# Patient Record
Sex: MALE | Race: AMERICAN INDIAN OR ALASKA NATIVE
[De-identification: names, ages, dates, MRNs, and addresses within clinical notes are randomized per-mention and may not be internally consistent; named-entity substitution may affect disease eponyms.]

---

## 2017-01-09 ENCOUNTER — HOSPITAL ENCOUNTER (OUTPATIENT)
Dept: HOSPITAL 5 - OR | Age: 67
Discharge: HOME | End: 2017-01-09
Attending: UROLOGY
Payer: MEDICARE

## 2017-01-09 VITALS — DIASTOLIC BLOOD PRESSURE: 88 MMHG | SYSTOLIC BLOOD PRESSURE: 165 MMHG

## 2017-01-09 DIAGNOSIS — Z82.49: ICD-10-CM

## 2017-01-09 DIAGNOSIS — Z90.79: ICD-10-CM

## 2017-01-09 DIAGNOSIS — Z85.46: ICD-10-CM

## 2017-01-09 DIAGNOSIS — N13.1: Primary | ICD-10-CM

## 2017-01-09 DIAGNOSIS — Z80.8: ICD-10-CM

## 2017-01-09 DIAGNOSIS — I10: ICD-10-CM

## 2017-01-09 DIAGNOSIS — Z87.891: ICD-10-CM

## 2017-01-09 LAB
ANION GAP SERPL CALC-SCNC: 16 MMOL/L
BUN SERPL-MCNC: 20 MG/DL (ref 9–20)
BUN/CREAT SERPL: 13.33 %
CALCIUM SERPL-MCNC: 9.3 MG/DL (ref 8.4–10.2)
CHLORIDE SERPL-SCNC: 102.5 MMOL/L (ref 98–107)
CO2 SERPL-SCNC: 25 MMOL/L (ref 22–30)
GLUCOSE SERPL-MCNC: 104 MG/DL (ref 75–100)
HCT VFR BLD CALC: 35.5 % (ref 35.5–45.6)
HGB BLD-MCNC: 11.1 GM/DL (ref 11.8–15.2)
POTASSIUM SERPL-SCNC: 4.3 MMOL/L (ref 3.6–5)
SODIUM SERPL-SCNC: 139 MMOL/L (ref 137–145)

## 2017-01-09 PROCEDURE — C2617 STENT, NON-COR, TEM W/O DEL: HCPCS

## 2017-01-09 PROCEDURE — 80048 BASIC METABOLIC PNL TOTAL CA: CPT

## 2017-01-09 PROCEDURE — 74000: CPT

## 2017-01-09 PROCEDURE — 36415 COLL VENOUS BLD VENIPUNCTURE: CPT

## 2017-01-09 PROCEDURE — 85018 HEMOGLOBIN: CPT

## 2017-01-09 PROCEDURE — 52332 CYSTOSCOPY AND TREATMENT: CPT

## 2017-01-09 PROCEDURE — 85014 HEMATOCRIT: CPT

## 2017-01-09 PROCEDURE — A4217 STERILE WATER/SALINE, 500 ML: HCPCS

## 2017-01-09 RX ADMIN — SODIUM CHLORIDE SCH MLS/HR: 0.9 INJECTION, SOLUTION INTRAVENOUS at 09:15

## 2017-01-09 RX ADMIN — HYDROMORPHONE HYDROCHLORIDE PRN MG: 1 INJECTION, SOLUTION INTRAMUSCULAR; INTRAVENOUS; SUBCUTANEOUS at 09:31

## 2017-01-09 RX ADMIN — MIDAZOLAM NR MG: 1 INJECTION INTRAMUSCULAR; INTRAVENOUS at 07:15

## 2017-01-09 RX ADMIN — SODIUM CHLORIDE SCH MLS/HR: 0.9 INJECTION, SOLUTION INTRAVENOUS at 06:55

## 2017-01-09 RX ADMIN — HYDROMORPHONE HYDROCHLORIDE PRN MG: 1 INJECTION, SOLUTION INTRAMUSCULAR; INTRAVENOUS; SUBCUTANEOUS at 09:10

## 2017-01-09 RX ADMIN — MIDAZOLAM NR MG: 1 INJECTION INTRAMUSCULAR; INTRAVENOUS at 08:00

## 2017-01-09 NOTE — POST ANESTHESIA EVALUATION
- Post Anesthesia Evaluation


Patient Participated: Yes


Airway Patent: Yes


Stable Respiratory Function: Yes


Nausea/Vomiting: No


Temp > 96.8F: Yes


Pain Manageable: Yes


Adequeate Hydration: Yes


Anesthesia Complications: No


Block Receding Appropriately: Not Applicable


Patient on Ventilator: No

## 2017-01-09 NOTE — DISCHARGE SUMMARY
Short Stay Discharge Plan


Activity: other (no straining )


Weight Bearing Status: Full Weight Bearing


Diet: regular


Special Instructions: other (inc fluids )


Durable Medical Equipment Needed Upon Discharge: other (j stent )


Follow up with: 


AMRY JOLLY II, MD [Primary Care Provider] - 7 Days


EMMA DUPONT MD [Staff Physician] - 6 Weeks

## 2017-01-09 NOTE — XRAY REPORT
SUPINE KUB:



The abdominal gas pattern is unremarkable.  No masses or

organomegaly is identified and there is no gross evidence of free air 

or fluid.  No significant soft tissue calcifications are noted. Left 

ureteral stent exchange was performed by Dr. Germain.



IMPRESSION:

No acute abdominal process.

## 2017-01-09 NOTE — OPERATIVE REPORT
INDICATIONS:  The patient is a gentleman with a history of extensive prostate

cancer, post radical prostatectomy.  He developed a post-radiation and likely

from extensive disease, a left ureteral stricture.  This has been treated with

stenting.  He has done quite well.  We had a 7-Spanish 24 cm double J, he did

well, now presents for stent exchange.



DESCRIPTION OF PROCEDURE:  The patient was brought to the operating room and

placed on the operating table.  Following induction of anesthesia, placed in

lithotomy position, prepped and draped in usual sterile fashion. 

Cystourethroscopy showed the stent.  There was minimal erythema around the

stent.  The urethra was quite narrow through the penile urethra, but no distinct

stricture, just quite narrow.  The bladder neck was wide open.  At this point,

the stent was grasped and wire placed up in the kidney and stent exchanged to a

7 x 24 once again.  The patient tolerated the procedure well and brought to

recovery room, family notified, without a Bender in stable condition.





DD: 01/09/2017 08:58

DT: 01/09/2017 09:25

JOB# [id number]  [id number]

MIGUEL/WEST

## 2017-01-09 NOTE — ANESTHESIA CONSULTATION
Anesthesia Consult and Med Hx


Date of service: 01/09/17





- Airway


Anesthetic Teeth Evaluation: Dentures


ROM Head & Neck: Adequate


Mental/Hyoid Distance: Adequate


Mallampati Class: Class II


Intubation Access Assessment: Probably Good





- Pulmonary Exam


CTA: Yes





- Cardiac Exam


Cardiac Exam: RRR





- Pre-Operative Health Status


ASA Pre-Surgery Classification: ASA2


Proposed Anesthetic Plan: General





- Pulmonary


Hx Smoking: Yes (STOPPED X 30 YRS-1/4-1/2 PPD)


Hx Asthma: No


SOB: No (positive TB test - took no meds)


Hx Sleep Apnea: No (NAUN PRE SCREEN HIGH RISK)





- Cardiovascular System


Hx Hypertension: Yes (X 4 YRS)


Hx Coronary Artery Disease: No


Hx Heart Attack/AMI: No


Hx Angina: No


Hx Cardia Arrhythmia: No


Hx Heart Murmur: No





- Central Nervous System


Hx Neuromuscular Disorder:  (s/p shingles; hx right leg fx)


Hx Psychiatric Problems: No





- Gastrointestinal


Hx Gastroesophageal Reflux Disease: No





- Endocrine


Hx End Stage Renal Disease: No


Hx Liver Disease: No


Hx Non-Insulin Dependent Diabetes: No


Hx Thyroid Disease: No





- Other Systems


Hx Alcohol Use: No


Hx Substance Use: No


Hx Cancer: Yes (Prostate CA/RADIATION 2015)

## 2017-01-09 NOTE — ANESTHESIA DAY OF SURGERY
Anesthesia Day of Surgery





- Day of Surgery


Patient Examined: Yes


Patient H&P Reviewed: Yes


Patient is NPO: Yes

## 2017-01-09 NOTE — POST OPERATIVE NOTE
Pre-op diagnosis: L ureteral stricture 


Post-op diagnosis: same


Findings: 





as above 


Procedure: 





cysto l stent exchange 


Anesthesia: GETA


Surgeon: EMMA DUPONT


Estimated blood loss: none


Pathology: none


Condition: stable


Disposition: PACU

## 2017-04-26 ENCOUNTER — HOSPITAL ENCOUNTER (OUTPATIENT)
Dept: HOSPITAL 5 - OR | Age: 67
Discharge: HOME | End: 2017-04-26
Attending: UROLOGY
Payer: MEDICARE

## 2017-04-26 VITALS — SYSTOLIC BLOOD PRESSURE: 162 MMHG | DIASTOLIC BLOOD PRESSURE: 75 MMHG
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
## 2017-04-26 DIAGNOSIS — Z87.891: ICD-10-CM

## 2017-04-26 DIAGNOSIS — N13.1: ICD-10-CM

## 2017-04-26 DIAGNOSIS — I10: ICD-10-CM

## 2017-04-26 DIAGNOSIS — C61: ICD-10-CM

## 2017-04-26 DIAGNOSIS — T83.592A: Primary | ICD-10-CM

## 2017-04-26 LAB
ANION GAP SERPL CALC-SCNC: 16 MMOL/L
BUN SERPL-MCNC: 24 MG/DL (ref 9–20)
BUN/CREAT SERPL: 14.11 %
CALCIUM SERPL-MCNC: 9.3 MG/DL (ref 8.4–10.2)
CHLORIDE SERPL-SCNC: 102.6 MMOL/L (ref 98–107)
CO2 SERPL-SCNC: 25 MMOL/L (ref 22–30)
GLUCOSE SERPL-MCNC: 90 MG/DL (ref 75–100)
POTASSIUM SERPL-SCNC: 4.3 MMOL/L (ref 3.6–5)
SODIUM SERPL-SCNC: 139 MMOL/L (ref 137–145)

## 2017-04-26 PROCEDURE — 80048 BASIC METABOLIC PNL TOTAL CA: CPT

## 2017-04-26 PROCEDURE — C1758 CATHETER, URETERAL: HCPCS

## 2017-04-26 PROCEDURE — C2617 STENT, NON-COR, TEM W/O DEL: HCPCS

## 2017-04-26 PROCEDURE — 36415 COLL VENOUS BLD VENIPUNCTURE: CPT

## 2017-04-26 PROCEDURE — 74000: CPT

## 2017-04-26 PROCEDURE — C1769 GUIDE WIRE: HCPCS

## 2017-04-26 PROCEDURE — 52332 CYSTOSCOPY AND TREATMENT: CPT

## 2017-04-26 PROCEDURE — A4217 STERILE WATER/SALINE, 500 ML: HCPCS

## 2017-04-26 NOTE — ANESTHESIA CONSULTATION
Anesthesia Consult and Med Hx


Date of service: 04/26/17





- Airway


Anesthetic Teeth Evaluation: Dentures, Edentulous


ROM Head & Neck: Adequate


Mental/Hyoid Distance: Adequate


Mallampati Class: Class II


Intubation Access Assessment: Probably Good





- Pulmonary Exam


CTA: Yes





- Cardiac Exam


Cardiac Exam: RRR





- Pre-Operative Health Status


ASA Pre-Surgery Classification: ASA3


Proposed Anesthetic Plan: General





- Pulmonary


Hx Smoking: Yes (STOPPED X 15 YRS-1/4-1/2 PPD)


Hx Asthma: No


SOB: No


Hx Sleep Apnea: No (NAUN PRE SCREEN HIGH RISK.)





- Cardiovascular System


Hx Hypertension: Yes (X 4 YRS)


Hx Cardia Arrhythmia: No


Hx Heart Murmur: No





- Central Nervous System


Hx Seizures: No


CVA: No


Hx Psychiatric Problems: No





- Gastrointestinal


Hx Gastroesophageal Reflux Disease: No





- Endocrine


Hx Renal Disease: No (hydronephrosis of right kidney 2/2 scarring)


Hx End Stage Renal Disease: No


Hx Liver Disease: No


Hx Non-Insulin Dependent Diabetes: No


Hx Thyroid Disease: No





- Other Systems


Hx Alcohol Use: No


Hx Substance Use: No


Hx Cancer: Yes (prostate 2015 s/p surgery and radiation)


Hx Obesity: No





- Additional Comments


Anesthesia Medical History Comments: h/o shingles on right chest, no prior 

anesthesia problems.

## 2017-04-26 NOTE — POST OPERATIVE NOTE
Date of procedure: 04/26/17


Pre-op diagnosis: L hydro 


Post-op diagnosis: same


Findings: 





as above


Procedure: 





cysto stent change 


Anesthesia: ANUM


Surgeon: EMMA DUPONT


Estimated blood loss: none


Pathology: none


Condition: stable


Disposition: PACU

## 2017-04-26 NOTE — XRAY REPORT
Retrograde pyelogram:



Hydronephrosis.



Initial image demonstrates a left nephroureteral stent. This apparently 

was exchanged for a new stent which appears in good positioning within 

the kidney and bladder. No other information.

## 2017-04-26 NOTE — POST ANESTHESIA EVALUATION
- Post Anesthesia Evaluation


Patient Participated: Yes


Airway Patent: Yes


Stable Respiratory Function: Yes


Nausea/Vomiting: No


Temp > 96.8F: Yes


Pain Manageable: Yes


Adequeate Hydration: Yes


Anesthesia Complications: No

## 2017-04-26 NOTE — OPERATIVE REPORT
PREOPERATIVE DIAGNOSES:  Prostate cancer, distal left ureteral stricture.



POSTOPERATIVE DIAGNOSES:  Prostate cancer, distal left ureteral stricture.



PROCEDURE:  Cystoscopy, stent exchange.



SURGEON:  José Miguel Germain MD



ANESTHESIA:  General.



FINDINGS:  This is a gentleman who has a distal ureteral stricture.  Each time,

we tried to take the stent out, he develops an infection, get sick, so he now

presents for stent exchange.



DESCRIPTION OF PROCEDURE:  The patient was brought to the operating room and

placed on the operating table.  Following induction of anesthesia, placed in

lithotomy position, prepped and draped in usual sterile fashion. 

Cystourethroscopy easily showed the stent, which was withdrawn from the meatus. 

A wire coiled in the kidney and a 7-Russian J-stent coiled in the kidney and

bladder.  The patient tolerated the procedure well.  No significant

complication.  No catheter was left.  He was brought to recovery room in stable

condition.





DD: 04/26/2017 13:52

DT: 04/26/2017 14:11

JOB# [id number]  [id number]

MIGUEL/WEST

## 2017-04-26 NOTE — DISCHARGE SUMMARY
Short Stay Discharge Plan


Activity: other (no straining )


Weight Bearing Status: Full Weight Bearing


Diet: regular


Special Instructions: other (inc fluids )


Durable Medical Equipment Needed Upon Discharge: other (j stent )


Follow up with: 


MARY JOLLY II, MD [Primary Care Provider] - 7 Days


EMMA DUPONT MD [Staff Physician] - 6 Weeks

## 2017-09-15 ENCOUNTER — HOSPITAL ENCOUNTER (OUTPATIENT)
Dept: HOSPITAL 5 - OR | Age: 67
Discharge: HOME | End: 2017-09-15
Attending: UROLOGY
Payer: COMMERCIAL

## 2017-09-15 VITALS — DIASTOLIC BLOOD PRESSURE: 76 MMHG | SYSTOLIC BLOOD PRESSURE: 150 MMHG

## 2017-09-15 DIAGNOSIS — Z87.891: ICD-10-CM

## 2017-09-15 DIAGNOSIS — N13.30: Primary | ICD-10-CM

## 2017-09-15 DIAGNOSIS — Z79.899: ICD-10-CM

## 2017-09-15 DIAGNOSIS — Z85.46: ICD-10-CM

## 2017-09-15 DIAGNOSIS — Z79.82: ICD-10-CM

## 2017-09-15 DIAGNOSIS — I10: ICD-10-CM

## 2017-09-15 PROCEDURE — 52332 CYSTOSCOPY AND TREATMENT: CPT

## 2017-09-15 PROCEDURE — A4217 STERILE WATER/SALINE, 500 ML: HCPCS

## 2017-09-15 PROCEDURE — C1769 GUIDE WIRE: HCPCS

## 2017-09-15 PROCEDURE — 74420 UROGRAPHY RTRGR +-KUB: CPT

## 2017-09-15 PROCEDURE — C1758 CATHETER, URETERAL: HCPCS

## 2017-09-15 PROCEDURE — C2617 STENT, NON-COR, TEM W/O DEL: HCPCS

## 2017-09-15 RX ADMIN — HYDROMORPHONE HYDROCHLORIDE PRN MG: 1 INJECTION, SOLUTION INTRAMUSCULAR; INTRAVENOUS; SUBCUTANEOUS at 16:40

## 2017-09-15 RX ADMIN — HYDROMORPHONE HYDROCHLORIDE PRN MG: 1 INJECTION, SOLUTION INTRAMUSCULAR; INTRAVENOUS; SUBCUTANEOUS at 16:55

## 2017-09-15 RX ADMIN — HYDROMORPHONE HYDROCHLORIDE PRN MG: 1 INJECTION, SOLUTION INTRAMUSCULAR; INTRAVENOUS; SUBCUTANEOUS at 18:10

## 2017-09-15 NOTE — SHORT STAY SUMMARY
Short Stay Documentation


Date of service: 09/15/17





- History


H&P: obtained from office





- Allergies and Medications


Current Medications: 


 Allergies





No Known Allergies Allergy (Verified 12/30/16 14:18)


 





 Home Medications











 Medication  Instructions  Recorded  Confirmed  Last Taken  Type


 


Aspirin EC [Aspirin Enteric Coated 81 mg PO DAILY 07/02/15 09/11/17 09/08/17 

History





TAB]     


 


amLODIPine [Norvasc] 10 mg PO DAILY 07/02/15 09/15/17 09/14/17 History








Active Medications





Famotidine (Pepcid)  20 mg PO PREOP NR


   Stop: 09/15/17 23:59


   Last Admin: 09/15/17 12:59 Dose:  20 mg


Hydromorphone HCl (Dilaudid)  0.5 mg IV Q10MIN PRN


   PRN Reason: Pain , Severe (7-10)


   Stop: 09/18/17 14:16


Sodium Chloride (Nacl 0.9% 1000 Ml)  1,000 mls @ 75 mls/hr IV AS DIRECT DEJAH


   Last Admin: 09/15/17 13:00 Dose:  75 mls/hr











- Brief post op/procedure progress note


Date of procedure: 09/15/17


Post-op diagnosis: same


Procedure: 





cysto, brpg, left stnet xchange 7x24


Anesthesia: GETA


Findings: 





nodev


Surgeon: CARMEN VAIL


Estimated blood loss: minimal


Pathology: none


Condition: stable





- Hospital course


Hospital course: 





orpacuhome





- Disposition


Condition at discharge: Good


Disposition: DC-01 TO HOME OR SELFCARE





Short Stay Discharge Plan


Activity: advance as tolerated


Diet: advance as tolerated


Follow up with: 


EMMA DUPONT MD [Staff Physician] - 7 Days

## 2017-09-15 NOTE — FLUOROSCOPY REPORT
Retrograde pyelogram:



The initial image demonstrates the presence of a left nephroureteral 

stent. 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 

annelise dissection. Injection of contrast into the right ureter is 

unremarkable with good opacification of the ureter and renal collecting 

system. At some point contrast apparently was injected on the left 

since is a small amount of contrast in slightly dilated calyces. A left 

internal stent was left in place.

## 2017-09-15 NOTE — ANESTHESIA CONSULTATION
Anesthesia Consult and Med Hx


Date of service: 09/15/17





- Airway


Anesthetic Teeth Evaluation: Edentulous


ROM Head & Neck: Adequate


Mental/Hyoid Distance: Adequate


Mallampati Class: Class II


Intubation Access Assessment: Probably Good





- Pulmonary Exam


CTA: Yes





- Cardiac Exam


Cardiac Exam: RRR





- Pre-Operative Health Status


ASA Pre-Surgery Classification: ASA2


Proposed Anesthetic Plan: General





- Pulmonary


Hx Smoking: Yes (STOPPED X 15 YRS-1/4-1/2 PPD)


Hx Asthma: No


SOB: No


Hx Sleep Apnea: No (NAUN PRE SCREEN HIGH RISK.)





- Cardiovascular System


Hx Hypertension: Yes (X 4 YRS)


Hx Cardia Arrhythmia: No


Hx Heart Murmur: No





- Central Nervous System


Hx Neuromuscular Disorder:  (s/p shingles; hx right leg fx)


Hx Seizures: No


CVA: No


Hx Psychiatric Problems: No





- Gastrointestinal


Hx Gastroesophageal Reflux Disease: No





- Endocrine


Hx Renal Disease: No


Hx End Stage Renal Disease: No


Hx Liver Disease: No


Hx Non-Insulin Dependent Diabetes: No


Hx Thyroid Disease: No





- Other Systems


Hx Alcohol Use: No


Hx Substance Use: No


Hx Cancer: Yes (prostate 2015 s/p surgery and radiation)


Hx Obesity: No

## 2017-09-27 NOTE — OPERATIVE REPORT
UROLOGY OPERATION NOTE



PREOPERATIVE DIAGNOSES:  Prostate cancer and hydronephrosis.



POSTOPERATIVE DIAGNOSES:  Prostate cancer and hydronephrosis.



PROCEDURE:  Cystoscopy, bilateral RPG, left stent exchange 7 x 24.



ANESTHESIA:  General.



FINDINGS:  No deviation.



SURGEON:  Adriel Carroll MD



ESTIMATED BLOOD LOSS:  Minimal.



PATHOLOGY:  None.



CONDITION:  Stable.



CLINICAL INDICATIONS:  Counseled on RCBA, antibiotics, SCDs.  The patient is

here for stent exchange, antibiotics, SCDs.



DESCRIPTION OF PROCEDURE:  The patient was transferred to OR suite in supine

position, anesthesia, dorsal lithotomy, prepped and draped in standard fashion. 

A 22-Thai scope passed into the bladder.  Upon entering the bladder, right UO

cannulated 8-Thai cone-tipped catheter, contrast injected.  Normal right

distal ureter, proximal ureter, renal pelvis calyces.  Delayed film was drained

adequately with not significant hydro _____ left stent visualized.  Glidewire

passed adjacent to this up to the renal pelvis.  Stent grasped, pulled out

intact.  A 7 x 24 double-J stent was passed over the wire under direct and

fluoroscopic visualization when the wire and string, nice proximal and distal J

within the bladder, bladder drained.  Scope removed.  The patient awakened and

transferred to the PACU in good and stable condition.





DD: 09/27/2017 08:11

DT: 09/27/2017 08:49

JOB# [id number]  [id number]

ATS/NTS

## 2018-01-31 ENCOUNTER — HOSPITAL ENCOUNTER (OUTPATIENT)
Dept: HOSPITAL 5 - OR | Age: 68
Discharge: HOME | End: 2018-01-31
Attending: UROLOGY
Payer: MEDICARE

## 2018-01-31 VITALS — DIASTOLIC BLOOD PRESSURE: 84 MMHG | SYSTOLIC BLOOD PRESSURE: 157 MMHG

## 2018-01-31 DIAGNOSIS — N28.89: ICD-10-CM

## 2018-01-31 DIAGNOSIS — N13.30: Primary | ICD-10-CM

## 2018-01-31 DIAGNOSIS — N13.8: ICD-10-CM

## 2018-01-31 DIAGNOSIS — Z87.891: ICD-10-CM

## 2018-01-31 DIAGNOSIS — I10: ICD-10-CM

## 2018-01-31 DIAGNOSIS — C63.7: ICD-10-CM

## 2018-01-31 DIAGNOSIS — Z79.899: ICD-10-CM

## 2018-01-31 DIAGNOSIS — Z79.82: ICD-10-CM

## 2018-01-31 PROCEDURE — A4217 STERILE WATER/SALINE, 500 ML: HCPCS

## 2018-01-31 PROCEDURE — C2617 STENT, NON-COR, TEM W/O DEL: HCPCS

## 2018-01-31 PROCEDURE — 74018 RADEX ABDOMEN 1 VIEW: CPT

## 2018-01-31 PROCEDURE — 52310 CYSTOSCOPY AND TREATMENT: CPT

## 2018-01-31 PROCEDURE — C1769 GUIDE WIRE: HCPCS

## 2018-01-31 NOTE — POST OPERATIVE NOTE
Date of procedure: 01/31/18


Pre-op diagnosis: L ureteral stricture 


Post-op diagnosis: same


Findings: 





stent 


Procedure: 





cysto stent exchange 


Anesthesia: ANUM


Surgeon: EMMA DUPONT


Estimated blood loss: none


Pathology: none


Condition: stable


Disposition: PACU

## 2018-01-31 NOTE — ANESTHESIA CONSULTATION
Anesthesia Consult and Med Hx


Date of service: 01/31/18





- Airway


Anesthetic Teeth Evaluation: Dentures (upper/lower)


ROM Head & Neck: Adequate


Mental/Hyoid Distance: Adequate


Mallampati Class: Class II


Intubation Access Assessment: Probably Good





- Pulmonary Exam


CTA: Yes





- Cardiac Exam


Cardiac Exam: RRR





- Pre-Operative Health Status


ASA Pre-Surgery Classification: ASA2


Proposed Anesthetic Plan: General





- Pulmonary


Hx Smoking: Yes (ex smoker )





- Cardiovascular System


Hx Hypertension: Yes (X 5 YRS)





- Other Systems


Hx Cancer: Yes (prostate 2015 s/p surgery and radiation)

## 2018-01-31 NOTE — OPERATIVE REPORT
PREOPERATIVE DIAGNOSIS:  Left hydronephrosis, T3 prostate cancer with seminal

vesicle invasion, post-radiation distal ureteral scarring.



POSTOPERATIVE DIAGNOSIS:  Left hydronephrosis, T3 prostate cancer with seminal

vesicle invasion, post-radiation distal ureteral scarring.



PROCEDURE:  Cystoscopy, stent exchange.



SURGEON:  José Miguel Germain MD.



ANESTHESIA:  General.



FINDINGS:  This is a gentleman with recurrent stricture, left distal ureter.  We

offered him a reimplant.  At this point, he is doing well with stent exchange. 

He now presents for stent exchange.



DESCRIPTION OF PROCEDURE:  The patient was brought to the operating room and

placed on the operating table.  Following induction of anesthesia, placed in

lithotomy position, prepped and draped in usual sterile fashion. 

Cystourethroscopy showed no papillary lesions.  The stent was withdrawn and a

wire coiled in the kidney.  A 7-Korean 24 cm double J coiled in the kidney and

bladder.  The patient tolerated the procedure well and brought to recovery in

stable condition.





DD: 01/31/2018 13:16

DT: 01/31/2018 15:27

JOB# [id number]  [id number]

MIGUEL/WEST

## 2018-01-31 NOTE — DISCHARGE SUMMARY
Short Stay Discharge Plan


Activity: no restrictions, other


Weight Bearing Status: Full Weight Bearing


Diet: regular


Special Instructions: other (inc fluids )


Durable Medical Equipment Needed Upon Discharge: other (j stnet )


Follow up with: 


MARY JOLLY II, MD [Primary Care Provider] - 7 Days


EMMA DUPONT MD [Staff Physician] - 6 Weeks

## 2018-02-01 NOTE — XRAY REPORT
KUB:



Left urinary tract stent exchange.



Left internal stent present and in good position exchanged with prior 

stent as seen on September 15, 2017. 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 

consistent with annelise dissection. No other findings.

## 2018-04-23 ENCOUNTER — HOSPITAL ENCOUNTER (OUTPATIENT)
Dept: HOSPITAL 5 - OR | Age: 68
Discharge: HOME | End: 2018-04-23
Attending: UROLOGY
Payer: COMMERCIAL

## 2018-04-23 VITALS — DIASTOLIC BLOOD PRESSURE: 73 MMHG | SYSTOLIC BLOOD PRESSURE: 160 MMHG

## 2018-04-23 DIAGNOSIS — N13.1: Primary | ICD-10-CM

## 2018-04-23 DIAGNOSIS — Z87.891: ICD-10-CM

## 2018-04-23 DIAGNOSIS — I10: ICD-10-CM

## 2018-04-23 PROCEDURE — C1769 GUIDE WIRE: HCPCS

## 2018-04-23 PROCEDURE — 74018 RADEX ABDOMEN 1 VIEW: CPT

## 2018-04-23 PROCEDURE — 52332 CYSTOSCOPY AND TREATMENT: CPT

## 2018-04-23 PROCEDURE — C2617 STENT, NON-COR, TEM W/O DEL: HCPCS

## 2018-04-23 PROCEDURE — A4217 STERILE WATER/SALINE, 500 ML: HCPCS

## 2018-04-23 NOTE — ANESTHESIA CONSULTATION
Anesthesia Consult and Med Hx


Date of service: 04/23/18





- Airway


Anesthetic Teeth Evaluation: Dentures


ROM Head & Neck: Adequate


Mental/Hyoid Distance: Adequate


Mallampati Class: Class II


Intubation Access Assessment: Good





- Pulmonary Exam


CTA: Yes





- Cardiac Exam


Cardiac Exam: RRR





- Pre-Operative Health Status


ASA Pre-Surgery Classification: ASA3


Proposed Anesthetic Plan: General





- Pulmonary


Hx Smoking: Yes (STOPPED X 16 YRS-1/2PPD)


Hx Asthma: No


SOB: No


Hx Sleep Apnea: No (NAUN PRE SCREEN HIGH RISK.)





- Cardiovascular System


Hx Hypertension: Yes (X 5 YRS)


Hx Cardia Arrhythmia: No


Hx Heart Murmur: No





- Central Nervous System


Hx Neuromuscular Disorder:  (s/p shingles; hx right leg fx)


Hx Seizures: No


CVA: No


Hx Psychiatric Problems: No





- Gastrointestinal


Hx Gastroesophageal Reflux Disease: No





- Endocrine


Hx Renal Disease: No


Hx End Stage Renal Disease: No


Hx Liver Disease: No


Hx Non-Insulin Dependent Diabetes: No


Hx Thyroid Disease: No





- Other Systems


Hx Alcohol Use: No


Hx Substance Use: No


Hx Cancer: Yes (prostate 2015 s/p surgery and radiation)


Hx Obesity: No

## 2018-04-23 NOTE — OPERATIVE REPORT
PREOPERATIVE DIAGNOSIS:  Distal left ureteral stricture.



POSTOPERATIVE DIAGNOSIS:  Distal left ureteral stricture.



PROCEDURE:  Cystoscopy, stent exchange.



SURGEON:  José Miguel Germain MD.



ANESTHESIA:  General.



FINDINGS:  This is a gentleman, who has a radiation stricture of distal left

ureter.  He does well with stent exchange.  He now presents for exchange.



DESCRIPTION OF PROCEDURE:  The patient was brought to the operating room and

placed on the operating table.  Following induction of anesthesia, placed in

lithotomy position, prepped and draped in usual sterile fashion. 

Cystourethroscopy revealed a stent, which was grasped out of the meatus.  There

were no bladder lesions.  The wire was coiled up in the kidney without

difficulty over the stent.  We tried a 7-[place]izen of [place], but it coiled the opposite way,

so we changed it ____ and it still coiled the opposite way in the kidney.  We

used a ____.  The patient tolerated the procedure well with no significant

complications.  He was brought to recovery in stable condition.  The wire went

up easily through the stent without difficulty and he was brought to recovery

room without a Bender.





DD: 04/23/2018 12:47

DT: 04/23/2018 13:48

JOB# [id number]  [id number]

MIGUEL/WEST

## 2018-04-23 NOTE — POST OPERATIVE NOTE
Date of procedure: 04/23/18


Pre-op diagnosis: left distal stricture 


Post-op diagnosis: same


Findings: 





as above 


Procedure: 





cuysto stent exchange 


Anesthesia: GETA


Surgeon: EMMA DUPONT


Estimated blood loss: none


Condition: stable


Disposition: PACU

## 2018-04-23 NOTE — XRAY REPORT
FINAL REPORT



PROCEDURE:  XR ABDOMEN 1V AP



TECHNIQUE:  Four fluoroscopic spot images the abdomen and pelvis

were submitted for dictation 



HISTORY:  HYDRONEPHROSIS 







COMPARISON:  No prior studies are available for comparison.



FINDINGS:  

A double-J pigtail catheter is noted on the left side with the

proximal pigtail in the left upper quadrant and distal pigtail in

the region of urinary bladder. Multiple surgical clips are

identified in the pelvis. Intestinal gas pattern is nonspecific. 



IMPRESSION:  

Four fluoroscopic spot images demonstrating a left-sided double-J

ureteral stent.

## 2018-04-23 NOTE — DISCHARGE SUMMARY
Short Stay Discharge Plan


Activity: other (no straining )


Weight Bearing Status: Full Weight Bearing


Diet: regular


Special Instructions: other (pt has j stent )


Durable Medical Equipment Needed Upon Discharge: other (stent)


Follow up with: 


MARY JOLLY II, MD [Primary Care Provider] - 7 Days


EMMA DUPONT MD [Staff Physician] - 6 Weeks

## 2018-06-25 ENCOUNTER — HOSPITAL ENCOUNTER (OUTPATIENT)
Dept: HOSPITAL 5 - OR | Age: 68
Discharge: HOME | End: 2018-06-25
Attending: UROLOGY
Payer: COMMERCIAL

## 2018-06-25 VITALS — SYSTOLIC BLOOD PRESSURE: 164 MMHG | DIASTOLIC BLOOD PRESSURE: 75 MMHG

## 2018-06-25 DIAGNOSIS — Z92.3: ICD-10-CM

## 2018-06-25 DIAGNOSIS — I10: ICD-10-CM

## 2018-06-25 DIAGNOSIS — Z87.891: ICD-10-CM

## 2018-06-25 DIAGNOSIS — N13.1: Primary | ICD-10-CM

## 2018-06-25 DIAGNOSIS — Z85.46: ICD-10-CM

## 2018-06-25 PROCEDURE — 74018 RADEX ABDOMEN 1 VIEW: CPT

## 2018-06-25 PROCEDURE — 88302 TISSUE EXAM BY PATHOLOGIST: CPT

## 2018-06-25 PROCEDURE — C1769 GUIDE WIRE: HCPCS

## 2018-06-25 PROCEDURE — 52332 CYSTOSCOPY AND TREATMENT: CPT

## 2018-06-25 PROCEDURE — 88300 SURGICAL PATH GROSS: CPT

## 2018-06-25 PROCEDURE — C2617 STENT, NON-COR, TEM W/O DEL: HCPCS

## 2018-06-25 NOTE — OPERATIVE REPORT
PREOPERATIVE DIAGNOSES:  Left distal ureteral stricture, left hydronephrosis.



POSTOPERATIVE DIAGNOSES:  Left distal ureteral stricture, left hydronephrosis.



PROCEDURE:  Cystoscopy, stent exchange.



SURGEON:  José Miguel Germain MD



ANESTHESIA:  General.



FINDINGS:  This is a gentleman who has a distal ureteral stricture with local

invasion of prostate cancer with radiation, now presents for stent exchange.  He

also has had old shingles.



DESCRIPTION OF PROCEDURE:  The patient was brought to the operating room and

placed in the operating table.  Following induction of anesthesia, placed in

lithotomy position, prepped and draped in usual sterile fashion. 

Cystourethroscopy revealed a stent, which was extracted from the meatus.  A wire

coiled up in the kidney and a 7-[place] 24 cm stent was placed up in the left

ureter.  The patient tolerated the procedure well.  No bladder lesions were

noted.  The prostate has previously been removed, wide open bladder neck.  The

patient tolerated the procedure well and brought to recovery in stable

condition.





DD: 06/25/2018 10:53

DT: 06/25/2018 11:16

[facility]# [id number]  [id number]

MIGUEL/WEST

## 2018-06-25 NOTE — POST OPERATIVE NOTE
Date of procedure: 06/25/18


Pre-op diagnosis: l hydro  stricture 


Post-op diagnosis: same


Findings: 





distal stricture 


Procedure: 





cysto stent exchange 


Anesthesia: ANUM


Surgeon: EMMA DUPONT


Estimated blood loss: none


Pathology: none


Condition: stable


Disposition: PACU

## 2018-06-25 NOTE — DISCHARGE SUMMARY
Short Stay Discharge Plan


Activity: other


Weight Bearing Status: Full Weight Bearing


Diet: regular


Special Instructions: other (inc fluids )


Follow up with: 


MARY JOLLY II, MD [Primary Care Provider] - 7 Days


EMMA DUPONT MD [Staff Physician] - 7 Days

## 2018-06-25 NOTE — ANESTHESIA CONSULTATION
Anesthesia Consult and Med Hx


Date of service: 06/25/18





- Airway


Anesthetic Teeth Evaluation: Edentulous


ROM Head & Neck: Adequate


Mental/Hyoid Distance: Adequate


Mallampati Class: Class II


Intubation Access Assessment: Probably Good





- Pulmonary Exam


CTA: Yes





- Cardiac Exam


Cardiac Exam: RRR





- Pre-Operative Health Status


ASA Pre-Surgery Classification: ASA2


Proposed Anesthetic Plan: General





- Pulmonary


Hx Smoking: Yes (STOPPED X 16 YRS-1/2PPD)


Hx Asthma: No


SOB: No


Hx Sleep Apnea: No (NAUN PRE SCREEN HIGH RISK.)





- Cardiovascular System


Hx Hypertension: Yes (X 5 YRS)


Hx Cardia Arrhythmia: No


Hx Heart Murmur: No





- Central Nervous System


Hx Neuromuscular Disorder:  (s/p shingles; hx right leg fx)


Hx Seizures: No


CVA: No


Hx Psychiatric Problems: No





- Gastrointestinal


Hx Gastroesophageal Reflux Disease: No





- Endocrine


Hx Renal Disease: No


Hx End Stage Renal Disease: No


Hx Liver Disease: No


Hx Non-Insulin Dependent Diabetes: No


Hx Thyroid Disease: No





- Other Systems


Hx Alcohol Use: No


Hx Substance Use: No


Hx Cancer: Yes (prostate 2015 s/p surgery and radiation)


Hx Obesity: No

## 2018-06-26 NOTE — XRAY REPORT
AP ABDOMEN:



HISTORY: Left hydronephrosis.



The abdominal gas pattern is unremarkable.  No masses or

organomegaly is identified and there is no gross evidence of free air 

or fluid. A left ureteral stent is in place. There are multiple 

surgical clips on both side of the bladder, correlate with surgical 

history. Left ureteral stent replacement was performed by the urologist.



IMPRESSION:

Unremarkable abdomen. Left ureteral stent replacement.

## 2019-03-04 ENCOUNTER — HOSPITAL ENCOUNTER (OUTPATIENT)
Dept: HOSPITAL 5 - OR | Age: 69
Discharge: HOME | End: 2019-03-04
Attending: UROLOGY
Payer: COMMERCIAL

## 2019-03-04 VITALS — SYSTOLIC BLOOD PRESSURE: 161 MMHG | DIASTOLIC BLOOD PRESSURE: 74 MMHG

## 2019-03-04 DIAGNOSIS — Z79.82: ICD-10-CM

## 2019-03-04 DIAGNOSIS — Z87.442: ICD-10-CM

## 2019-03-04 DIAGNOSIS — Z87.440: ICD-10-CM

## 2019-03-04 DIAGNOSIS — Z98.890: ICD-10-CM

## 2019-03-04 DIAGNOSIS — Z79.899: ICD-10-CM

## 2019-03-04 DIAGNOSIS — I10: ICD-10-CM

## 2019-03-04 DIAGNOSIS — Z79.01: ICD-10-CM

## 2019-03-04 DIAGNOSIS — Z90.49: ICD-10-CM

## 2019-03-04 DIAGNOSIS — Z87.891: ICD-10-CM

## 2019-03-04 DIAGNOSIS — N13.1: Primary | ICD-10-CM

## 2019-03-04 DIAGNOSIS — C61: ICD-10-CM

## 2019-03-04 PROCEDURE — C1769 GUIDE WIRE: HCPCS

## 2019-03-04 PROCEDURE — A4217 STERILE WATER/SALINE, 500 ML: HCPCS

## 2019-03-04 PROCEDURE — 74018 RADEX ABDOMEN 1 VIEW: CPT

## 2019-03-04 PROCEDURE — C2617 STENT, NON-COR, TEM W/O DEL: HCPCS

## 2019-03-04 PROCEDURE — 52332 CYSTOSCOPY AND TREATMENT: CPT

## 2019-03-04 NOTE — DISCHARGE SUMMARY
Short Stay Discharge Plan


Activity: other (no straining )


Weight Bearing Status: Full Weight Bearing


Diet: regular


Special Instructions: other (inc fliids )


Durable Medical Equipment Needed Upon Discharge: other (has j stent )


Follow up with: 


MARY JOLLY II, MD [Primary Care Provider] - 7 Days


EMMA DUPONT MD [Staff Physician] - 6 Weeks

## 2019-03-04 NOTE — ANESTHESIA CONSULTATION
Anesthesia Consult and Med Hx


Date of service: 03/04/19





- Airway


Anesthetic Teeth Evaluation: Dentures


ROM Head & Neck: Adequate


Mental/Hyoid Distance: Adequate


Mallampati Class: Class II


Intubation Access Assessment: Probably Good





- Pulmonary Exam


CTA: Yes





- Cardiac Exam


Cardiac Exam: No Murmur





- Pre-Operative Health Status


ASA Pre-Surgery Classification: ASA3


Proposed Anesthetic Plan: General





- Pulmonary


Hx Smoking: Yes (STOPPED X 16 YRS-1/2PPD)


Hx Asthma: No


SOB: No


Hx Sleep Apnea: No (NAUN PRE SCREEN HIGH RISK.)





- Cardiovascular System


Hx Hypertension: Yes (X 5 YRS)


Hx Cardia Arrhythmia: No


Hx Heart Murmur: No





- Central Nervous System


Hx Neuromuscular Disorder:  (s/p shingles; hx right leg fx)


Hx Seizures: No


CVA: No


Hx Psychiatric Problems: No





- Gastrointestinal


Hx Gastroesophageal Reflux Disease: No





- Endocrine


Hx Renal Disease: No


Hx End Stage Renal Disease: No


Hx Liver Disease: No


Hx Non-Insulin Dependent Diabetes: No


Hx Thyroid Disease: No





- Other Systems


Hx Alcohol Use: No


Hx Substance Use: No


Hx Cancer: Yes


Hx Obesity: No

## 2019-03-05 NOTE — XRAY REPORT
AP ABDOMEN:



HISTORY: Hydronephrosis.



This examination is a  film for a urologic procedure which was 

apparently not performed. The image demonstrates a left ureteral stent 

in good position. The bowel gas pattern is within normal limits. There 

are multiple surgical staples on both sides of the pelvis/bladder. No 

obvious pathologic calcifications are identified Left ureteral stent 

exchange was performed per the procedural note.



IMPRESSION:

Unremarkable abdomen. Left ureteral stent exchange.

## 2019-03-05 NOTE — POST ANESTHESIA EVALUATION
- Post Anesthesia Evaluation


Patient Participated: Yes


Airway Patent: Yes


Stable Respiratory Function: Yes


Nausea/Vomiting: No


Temp > 96.8F: Yes


Pain Manageable: Yes


Adequeate Hydration: Yes


Anesthesia Complications: No


Block Receding Appropriately: Not Applicable


Patient on Ventilator: No


Other Comments: From yesterday

## 2019-03-11 NOTE — OPERATIVE REPORT
PREOPERATIVE DIAGNOSIS:  Left distal ureteral stricture.



POSTOPERATIVE DIAGNOSIS:  Left distal ureteral stricture.



PROCEDURE:  Cystoscopy, stent exchange.



SURGEON:  José Miguel Germain MD



ANESTHESIA:  General.



FINDINGS:  This is a gentleman who has had prostate cancer and radiation

therapy.  He developed a stricture in the distal ureter.  He now presents for

stent exchange.



DESCRIPTION OF PROCEDURE:  The patient was brought to the operating room and

placed on the operating table.  Following induction of anesthesia, placed in

lithotomy position, prepped and draped in usual sterile fashion.  He was

post-radical prostatectomy.  There were no bladder lesions.  The stent was

removed through the meatus.  A wire coiled in the kidney and a double J coiled

back in the kidney.  The patient tolerated the procedure well and brought to

recovery in stable condition.





DD: 03/11/2019 09:54

DT: 03/11/2019 12:49

JOB# [id number]  [id number]

MIGUEL/WEST

## 2019-07-15 ENCOUNTER — HOSPITAL ENCOUNTER (OUTPATIENT)
Dept: HOSPITAL 5 - OR | Age: 69
Discharge: HOME | End: 2019-07-15
Attending: UROLOGY
Payer: COMMERCIAL

## 2019-07-15 VITALS — SYSTOLIC BLOOD PRESSURE: 143 MMHG | DIASTOLIC BLOOD PRESSURE: 70 MMHG

## 2019-07-15 DIAGNOSIS — Z87.891: ICD-10-CM

## 2019-07-15 DIAGNOSIS — N13.1: Primary | ICD-10-CM

## 2019-07-15 DIAGNOSIS — I10: ICD-10-CM

## 2019-07-15 DIAGNOSIS — Z87.440: ICD-10-CM

## 2019-07-15 DIAGNOSIS — B20: ICD-10-CM

## 2019-07-15 DIAGNOSIS — Z87.442: ICD-10-CM

## 2019-07-15 DIAGNOSIS — Z86.2: ICD-10-CM

## 2019-07-15 DIAGNOSIS — Z98.890: ICD-10-CM

## 2019-07-15 DIAGNOSIS — Z85.46: ICD-10-CM

## 2019-07-15 DIAGNOSIS — Z79.82: ICD-10-CM

## 2019-07-15 DIAGNOSIS — Z79.899: ICD-10-CM

## 2019-07-15 PROCEDURE — C1769 GUIDE WIRE: HCPCS

## 2019-07-15 PROCEDURE — C2617 STENT, NON-COR, TEM W/O DEL: HCPCS

## 2019-07-15 PROCEDURE — C1758 CATHETER, URETERAL: HCPCS

## 2019-07-15 PROCEDURE — 74018 RADEX ABDOMEN 1 VIEW: CPT

## 2019-07-15 PROCEDURE — 52332 CYSTOSCOPY AND TREATMENT: CPT

## 2019-07-15 PROCEDURE — A4217 STERILE WATER/SALINE, 500 ML: HCPCS

## 2019-07-15 NOTE — POST OPERATIVE NOTE
Date of procedure: 07/15/19


Pre-op diagnosis: left ureteral stricture 


Post-op diagnosis: same


Findings: 





as above 


Procedure: 





cysto stent 


Anesthesia: ANUM


Surgeon: EMMA DUPONT


Estimated blood loss: none


Condition: stable


Disposition: PACU

## 2019-07-15 NOTE — OPERATIVE REPORT
PREOPERATIVE DIAGNOSES:  Left ureteral stricture, chronic left hydronephrosis,

human immunodeficiency viruses.



POSTOPERATIVE DIAGNOSES:  Left ureteral stricture, chronic left hydronephrosis,

human immunodeficiency viruses.



PROCEDURE:  Cystoscopy, left stent exchange.



SURGEON:  José Miguel Germain MD



ANESTHESIA:  General.



FINDINGS:  This is a gentleman with history of prostate cancer, seminal vesicle

invasion, radiations, distal stricture, HIV.  He now presents for stent

exchange.



DESCRIPTION OF PROCEDURE:  The patient was brought to the operating room and

placed on the operating table.  Following induction of anesthesia, placed in

lithotomy position, prepped and draped in usual sterile fashion.  Stent was

easily removed and a wire coiled up in the kidney.  A 6-Hebrew double-J 24-cm

coiled in the kidney and bladder.  The patient tolerated the procedure well.  No

significant complications, brought to recovery in stable condition.  Family

notified.





DD: 07/15/2019 11:17

DT: 07/15/2019 12:19

JOB# [id number]  [id number]

MIGUEL/WEST

## 2019-07-15 NOTE — ANESTHESIA CONSULTATION
Anesthesia Consult and Med Hx


Date of service: 07/15/19





- Airway


Anesthetic Teeth Evaluation: Dentures


ROM Head & Neck: Adequate


Mental/Hyoid Distance: Adequate


Mallampati Class: Class III


Intubation Access Assessment: Possibly Difficult





- Pulmonary Exam


CTA: Yes





- Cardiac Exam


Cardiac Exam: RRR





- Pre-Operative Health Status


ASA Pre-Surgery Classification: ASA2





- Pulmonary


Hx Smoking: Yes (STOPPED X 16 YRS-1/2PPD)


Hx Respiratory Symptoms: No


COPD: No


Hx Sleep Apnea: No (NAUN PRE SCREEN HIGH RISK.)





- Cardiovascular System


Hx Hypertension: Yes


Hx Heart Attack/AMI: No


Hx Percutaneous Transluminal Coronary Angioplasty (PTCA): No


Hx Cardia Arrhythmia: No





- Central Nervous System


Hx Seizures: No


CVA: No


Hx Psychiatric Problems: No





- Gastrointestinal


Hx Gastroesophageal Reflux Disease: No





- Endocrine


Hx Renal Disease: No


Hx Liver Disease: No


Hx Insulin Dependent Diabetes: No


Hx Non-Insulin Dependent Diabetes: No


Hx Thyroid Disease: No





- Other Systems


Hx Cancer: Yes


Hx Obesity: No





- Additional Comments


Anesthesia Medical History Comments: No hx anesthetic complications. Last dose 

ASA 7/14/19.

## 2019-07-15 NOTE — XRAY REPORT
ABDOMEN 1 VIEW(S)



INDICATION / CLINICAL INFORMATION:

HYDRONEPHROSIS.



COMPARISON: 

None available.



FINDINGS:



Fluoroscopy was provided by radiology for a urologic procedure. 2 separate AP fluoroscopic images of 
the abdomen are presented. A  image demonstrates a left ureteral stent in place. A second AP audrey
ge of the abdomen demonstrates left ureteral stent exchange which is in good position.



The bowel gas pattern is unremarkable. There are multiple surgical clips on both sides of the pelvis,
 correlate with surgical history.



IMPRESSION:

Left ureteral stent exchange. Otherwise, unremarkable abdomen. Correlate with the urology report as n
eeded. 



Signer Name: Rick Quach Jr, MD 

Signed: 7/15/2019 12:13 PM

 Workstation Name: [id number]

## 2019-07-15 NOTE — DISCHARGE SUMMARY
Short Stay Discharge Plan


Activity: other (no straining )


Weight Bearing Status: Full Weight Bearing


Diet: regular


Special Instructions: other (inc fluids )


Durable Medical Equipment Needed Upon Discharge: other (aydin hernandez in rr )


Follow up with: 


MARY JOLLY II, MD [Primary Care Provider] - 7 Days


EMMA DUPONT MD [Staff Physician] - 6 Weeks

## 2019-10-29 ENCOUNTER — HOSPITAL ENCOUNTER (OUTPATIENT)
Dept: HOSPITAL 5 - OR | Age: 69
Discharge: HOME | End: 2019-10-29
Attending: UROLOGY
Payer: MEDICARE

## 2019-10-29 VITALS — SYSTOLIC BLOOD PRESSURE: 154 MMHG | DIASTOLIC BLOOD PRESSURE: 72 MMHG

## 2019-10-29 DIAGNOSIS — Z87.442: ICD-10-CM

## 2019-10-29 DIAGNOSIS — Z85.46: ICD-10-CM

## 2019-10-29 DIAGNOSIS — Z87.891: ICD-10-CM

## 2019-10-29 DIAGNOSIS — Z98.890: ICD-10-CM

## 2019-10-29 DIAGNOSIS — Z79.899: ICD-10-CM

## 2019-10-29 DIAGNOSIS — Z90.49: ICD-10-CM

## 2019-10-29 DIAGNOSIS — B20: ICD-10-CM

## 2019-10-29 DIAGNOSIS — I10: ICD-10-CM

## 2019-10-29 DIAGNOSIS — N13.5: Primary | ICD-10-CM

## 2019-10-29 PROCEDURE — 52332 CYSTOSCOPY AND TREATMENT: CPT

## 2019-10-29 PROCEDURE — C1769 GUIDE WIRE: HCPCS

## 2019-10-29 PROCEDURE — C2617 STENT, NON-COR, TEM W/O DEL: HCPCS

## 2019-10-29 PROCEDURE — 74018 RADEX ABDOMEN 1 VIEW: CPT

## 2019-10-29 NOTE — POST OPERATIVE NOTE
Date of procedure: 10/29/19


Pre-op diagnosis: left hydro 


Post-op diagnosis: same


Findings: 





as above 


Procedure: 





cysto stent exchange 


Anesthesia: ANUM


Surgeon: EMMA DUPONT


Estimated blood loss: none


Condition: stable


Disposition: PACU

## 2019-10-29 NOTE — OPERATIVE REPORT
PREOPERATIVE DIAGNOSES:  Ureteral stricture, human immunodeficiency virus,

history of prostate cancer and radiation.



POSTOPERATIVE DIAGNOSES:  Ureteral stricture, human immunodeficiency virus,

history of prostate cancer and radiation.



PROCEDURE:  Cystoscopy, stent exchange.



SURGEON:  José Miguel Germain M.D.



ANESTHESIA:  General.



FINDINGS:  This is a gentleman for stent exchange.  The stent has been partially

obstructed, has intermittent discomfort, now presents for stent exchange.



DESCRIPTION OF PROCEDURE:  The patient was brought to the operating room and

placed on the operating table.  Following induction of anesthesia, placed in

lithotomy position, prepped and draped in usual sterile fashion.  Scope was

inserted.  Stent was withdrawn.  The wire went through the stent.  _____ Kinyarwanda

double J coiled in the kidney and bladder.  The patient tolerated the procedure

well and brought to recovery in stable condition.





DD: 10/29/2019 12:12

DT: 10/29/2019 12:24

JOB# [id number]  [id number]

MIGUEL/WEST

## 2019-10-29 NOTE — XRAY REPORT
INTRAOPERATIVE FLUOROSCOPY: ABDOMEN



INDICATION / CLINICAL INFORMATION:

Left ureteral stent exchange, hydronephrosis.



TECHNIQUE:

Intraoperative spot images were obtained during the procedure.



FINDINGS:

2 images obtained show left ureteral stent exchange.



Fluoroscopy Time: 27 seconds. Fluoroscopy Images: 2.



Signer Name: Carlos Ball MD 

Signed: 10/29/2019 1:41 PM

 Workstation Name: [id number]

## 2019-10-29 NOTE — DISCHARGE SUMMARY
Short Stay Discharge Plan


Activity: other (no straining )


Weight Bearing Status: Full Weight Bearing


Diet: regular


Special Instructions: other (inc fluids )


Durable Medical Equipment Needed Upon Discharge: other (has stent )


Follow up with: 


MARY JOLLY II, MD [Primary Care Provider] - 7 Days


EMMA DUPONT MD [Staff Physician] - 6 Weeks

## 2019-10-29 NOTE — ANESTHESIA CONSULTATION
Anesthesia Consult and Med Hx


Date of service: 10/29/19





- Airway


Anesthetic Teeth Evaluation: Dentures


ROM Head & Neck: Adequate


Mental/Hyoid Distance: Adequate


Mallampati Class: Class II


Intubation Access Assessment: Good





- Pulmonary Exam


CTA: Yes





- Cardiac Exam


Cardiac Exam: RRR





- Pre-Operative Health Status


ASA Pre-Surgery Classification: ASA2


Proposed Anesthetic Plan: General





- Pulmonary


Hx Smoking: Yes (STOPPED X 20 YRS)


Hx Asthma: No


Hx Respiratory Symptoms: No


SOB: No


COPD: No


Hx Sleep Apnea: No (NAUN PRE SCREEN HIGH RISK)





- Cardiovascular System


Hx Hypertension: Yes


Hx Heart Attack/AMI: No


Hx Angina: No


Hx Percutaneous Transluminal Coronary Angioplasty (PTCA): No


Hx Cardia Arrhythmia: No


Hx Heart Murmur: No





- Central Nervous System


Hx Neuromuscular Disorder:  (s/p shingles; hx right leg fx)


Hx Seizures: No


CVA: No


Hx Back Pain: Yes (LOWER OCC.)


Hx Psychiatric Problems: No





- Gastrointestinal


Hx Gastroesophageal Reflux Disease: No





- Endocrine


Hx Renal Disease: No


Hx End Stage Renal Disease: No


Hx Liver Disease: No


Hx Insulin Dependent Diabetes: No


Hx Non-Insulin Dependent Diabetes: No


Hx Thyroid Disease: No


Hx Hypothyroidism: No





- Hematic


Hx Anemia: No





- Other Systems


Hx Alcohol Use: No


Hx Substance Use: No


Hx Cancer: Yes


Hx Obesity: No